# Patient Record
Sex: MALE | Race: BLACK OR AFRICAN AMERICAN | Employment: UNEMPLOYED | URBAN - METROPOLITAN AREA
[De-identification: names, ages, dates, MRNs, and addresses within clinical notes are randomized per-mention and may not be internally consistent; named-entity substitution may affect disease eponyms.]

---

## 2024-06-10 ENCOUNTER — OFFICE VISIT (OUTPATIENT)
Age: 24
End: 2024-06-10

## 2024-06-10 VITALS
SYSTOLIC BLOOD PRESSURE: 136 MMHG | TEMPERATURE: 98.3 F | OXYGEN SATURATION: 96 % | DIASTOLIC BLOOD PRESSURE: 62 MMHG | RESPIRATION RATE: 18 BRPM | HEART RATE: 71 BPM

## 2024-06-10 DIAGNOSIS — B00.9 HERPES SIMPLEX: Primary | ICD-10-CM

## 2024-06-10 DIAGNOSIS — Z20.2 EXPOSURE TO STD: ICD-10-CM

## 2024-06-10 RX ORDER — ACYCLOVIR 800 MG/1
800 TABLET ORAL 2 TIMES DAILY
Qty: 20 TABLET | Refills: 0 | Status: SHIPPED | OUTPATIENT
Start: 2024-06-10 | End: 2024-06-20

## 2024-06-10 NOTE — PROGRESS NOTES
Barrera Sharif (:  2000) is a 24 y.o. male, here for evaluation of the following chief complaint(s):  Sexually Transmitted Diseases (Pt wants to be tested for STD's, he just got out of [facility]. Pt states he has a sore on his lip)    ASSESSMENT/PLAN:  Visit Diagnoses and Associated Orders       Herpes simplex    -  Primary    C.trachomatis N.gonorrhoeae DNA, Urine [TIU0580 Custom]      Trichomonas by EIA [36540 Custom]      RPR REFLEX [73205 Custom]      HIV Screen [85069 Custom]      Hep C AB RLFX HCV PCR-A [50729 Custom]           Exposure to STD        C.trachomatis N.gonorrhoeae DNA, Urine [[id number] Custom]      Trichomonas by EIA [25165 Custom]      RPR REFLEX [63587 Custom]      HIV Screen [80222 Custom]      Hep C AB RLFX HCV PCR-A [93745 Custom]           ORDERS WITHOUT AN ASSOCIATED DIAGNOSIS    acyclovir (ZOVIRAX) 800 MG tablet [8972]             Summary  - I recommended abstinence from intercourse for at least 1 week and only when symptoms have completely resolved. I also encouraged re-testing in near future to confirm absence of infection.  - The patient requested immediate treatment for the suspected disease(s).  Differentials: Cystitis, Urethritis, Chlamydia, Gonorrhea, Trichomoniasis, Candidal Infection, Bacterial Vaginosis, Pelvis inflammatory disease.    SUBJECTIVE/OBJECTIVE:  TALI Rust presents to the clinic with concerns regarding a possible genitourinary infection.  He reports symptoms of a lesion on his lip. He denies chills, dysuria, and frequency. Furthermore, he reports that he was recently released from [facility].    He reports a history of previous STDs and has a history of HSV 1 and 2.     Vitals:    06/10/24 1209   BP: 136/62   Pulse: 71   Resp: 18   Temp: 98.3 °F (36.8 °C)   SpO2: 96%       No results found for this visit on 06/10/24.     Review of Systems    Physical Exam  Vitals reviewed.   Constitutional:       Appearance: He is normal weight.   HENT:      Head:

## 2024-06-11 LAB
C TRACH DNA UR QL NAA+PROBE: NEGATIVE
HIV 2 AB SERPL QL IA: NORMAL
HIV1 AB SERPL QL IA: NORMAL
HIV1 P24 AG SERPL QL IA: NORMAL
N GONORRHOEA DNA UR QL NAA+PROBE: NEGATIVE
REAGIN+T PALLIDUM IGG+IGM SERPL-IMP: NORMAL
SPECIMEN TYPE: NORMAL
TRICHOMONAS VAGINALIS SCREEN: NEGATIVE

## 2024-06-12 ENCOUNTER — TELEPHONE (OUTPATIENT)
Age: 24
End: 2024-06-12

## 2024-06-12 LAB
HCV AB S/CO SERPL IA: 0.05 IV
HCV AB SERPL QL IA: NEGATIVE

## 2024-06-12 NOTE — TELEPHONE ENCOUNTER
Attempted to call Barrera to let him know his labs were normal. His phone is no longer in service.

## 2025-02-21 ENCOUNTER — OFFICE VISIT (OUTPATIENT)
Age: 25
End: 2025-02-21

## 2025-02-21 VITALS
SYSTOLIC BLOOD PRESSURE: 138 MMHG | OXYGEN SATURATION: 97 % | TEMPERATURE: 98 F | RESPIRATION RATE: 18 BRPM | DIASTOLIC BLOOD PRESSURE: 81 MMHG | HEART RATE: 92 BPM

## 2025-02-21 DIAGNOSIS — R03.0 ELEVATED BLOOD PRESSURE READING: ICD-10-CM

## 2025-02-21 DIAGNOSIS — B00.1 COLD SORE: Primary | ICD-10-CM

## 2025-02-21 RX ORDER — VALACYCLOVIR HYDROCHLORIDE 1 G/1
1000 TABLET, FILM COATED ORAL 3 TIMES DAILY
Qty: 21 TABLET | Refills: 0 | Status: SHIPPED | OUTPATIENT
Start: 2025-02-21 | End: 2025-02-28

## 2025-02-21 NOTE — PROGRESS NOTES
Barrera Sharif (:  2000) is a 25 y.o. male,Established patient, here for evaluation of the following chief complaint(s):  Rash (Pt c/o fever blister on his lip x 1 day)      ASSESSMENT/PLAN:    ICD-10-CM    1. Cold sore  B00.1 valACYclovir (VALTREX) 1 g tablet      2. Elevated blood pressure reading  R03.0 Amb Referral to Primary Care          Dx Disposition: cold sore  Education and handout provided on diagnosis and management of symptoms.   AVS reviewed with patient. Follow up as needed in [facility] or with PCP for new or worsening symptoms.   Return if symptoms worsen or fail to improve.    SUBJECTIVE/OBJECTIVE:  Patient presents today with complaints of cold sore of lip that started 2 days ago      History provided by:  Patient   used: No    Rash        Vitals:    25 1207   BP: 138/81   Pulse: 92   Resp: 18   Temp: 98 °F (36.7 °C)   SpO2: 97%       Review of Systems   Skin:  Positive for rash.       Physical Exam  Constitutional:       Appearance: Normal appearance.   HENT:      Head: Normocephalic.      Nose: Nose normal.      Mouth/Throat:      Mouth: Mucous membranes are moist. Oral lesions present.      Pharynx: Oropharynx is clear.     Cardiovascular:      Rate and Rhythm: Normal rate and regular rhythm.      Heart sounds: Normal heart sounds.   Pulmonary:      Effort: Pulmonary effort is normal.   Musculoskeletal:         General: Normal range of motion.   Skin:     General: Skin is warm and dry.   Neurological:      General: No focal deficit present.      Mental Status: He is alert and oriented to person, place, and time.   Psychiatric:         Mood and Affect: Mood normal.         Behavior: Behavior normal.           An electronic signature was used to authenticate this note.    --Clark Buckley, TERRA - CNP

## 2025-02-21 NOTE — PATIENT INSTRUCTIONS
Thank you for allowing us to care for you today and we hope you feel better soon  New Prescriptions    VALACYCLOVIR (VALTREX) 1 G TABLET    Take 1 tablet by mouth 3 times daily for 7 days